# Patient Record
Sex: MALE | Race: WHITE | HISPANIC OR LATINO | Employment: OTHER | URBAN - METROPOLITAN AREA
[De-identification: names, ages, dates, MRNs, and addresses within clinical notes are randomized per-mention and may not be internally consistent; named-entity substitution may affect disease eponyms.]

---

## 2024-02-05 ENCOUNTER — HOSPITAL ENCOUNTER (EMERGENCY)
Facility: HOSPITAL | Age: 69
Discharge: HOME | End: 2024-02-05
Payer: MEDICAID

## 2024-02-05 ENCOUNTER — APPOINTMENT (OUTPATIENT)
Dept: RADIOLOGY | Facility: HOSPITAL | Age: 69
End: 2024-02-05
Payer: MEDICAID

## 2024-02-05 VITALS
DIASTOLIC BLOOD PRESSURE: 77 MMHG | RESPIRATION RATE: 20 BRPM | WEIGHT: 200 LBS | HEIGHT: 67 IN | TEMPERATURE: 97.3 F | BODY MASS INDEX: 31.39 KG/M2 | HEART RATE: 71 BPM | SYSTOLIC BLOOD PRESSURE: 162 MMHG | OXYGEN SATURATION: 98 %

## 2024-02-05 DIAGNOSIS — M25.561 PAIN IN BOTH KNEES, UNSPECIFIED CHRONICITY: Primary | ICD-10-CM

## 2024-02-05 DIAGNOSIS — Z76.0 MEDICATION REFILL: ICD-10-CM

## 2024-02-05 DIAGNOSIS — M17.10 ARTHRITIS OF KNEE: ICD-10-CM

## 2024-02-05 DIAGNOSIS — M25.562 PAIN IN BOTH KNEES, UNSPECIFIED CHRONICITY: Primary | ICD-10-CM

## 2024-02-05 PROCEDURE — 99283 EMERGENCY DEPT VISIT LOW MDM: CPT | Mod: 25

## 2024-02-05 PROCEDURE — 73562 X-RAY EXAM OF KNEE 3: CPT | Mod: BILATERAL PROCEDURE | Performed by: RADIOLOGY

## 2024-02-05 PROCEDURE — 93971 EXTREMITY STUDY: CPT | Performed by: RADIOLOGY

## 2024-02-05 PROCEDURE — 93970 EXTREMITY STUDY: CPT

## 2024-02-05 PROCEDURE — 99284 EMERGENCY DEPT VISIT MOD MDM: CPT | Mod: 25

## 2024-02-05 PROCEDURE — 73562 X-RAY EXAM OF KNEE 3: CPT | Mod: 50

## 2024-02-05 RX ORDER — ATORVASTATIN CALCIUM 10 MG/1
40 TABLET, FILM COATED ORAL DAILY
Qty: 80 TABLET | Refills: 0 | Status: SHIPPED | OUTPATIENT
Start: 2024-02-05 | End: 2024-02-25

## 2024-02-05 RX ORDER — NAPROXEN SODIUM 220 MG/1
81 TABLET, FILM COATED ORAL DAILY
Qty: 360 TABLET | Refills: 0 | Status: SHIPPED | OUTPATIENT
Start: 2024-02-05 | End: 2025-02-04

## 2024-02-05 RX ORDER — NAPROXEN 375 MG/1
375 TABLET ORAL 2 TIMES DAILY PRN
Qty: 10 TABLET | Refills: 0 | Status: SHIPPED | OUTPATIENT
Start: 2024-02-05 | End: 2024-02-15

## 2024-02-05 RX ORDER — METOPROLOL TARTRATE 50 MG/1
25 TABLET ORAL 2 TIMES DAILY
Qty: 20 TABLET | Refills: 0 | Status: SHIPPED | OUTPATIENT
Start: 2024-02-05 | End: 2024-02-25

## 2024-02-05 RX ORDER — FUROSEMIDE 40 MG/1
40 TABLET ORAL 2 TIMES DAILY
Qty: 40 TABLET | Refills: 0 | Status: SHIPPED | OUTPATIENT
Start: 2024-02-05 | End: 2024-02-25

## 2024-02-05 RX ORDER — DICLOFENAC SODIUM 10 MG/G
4 GEL TOPICAL 4 TIMES DAILY PRN
Qty: 100 G | Refills: 0 | Status: SHIPPED | OUTPATIENT
Start: 2024-02-05

## 2024-02-05 ASSESSMENT — PAIN DESCRIPTION - LOCATION: LOCATION: KNEE

## 2024-02-05 ASSESSMENT — PAIN SCALES - GENERAL: PAINLEVEL_OUTOF10: 8

## 2024-02-05 ASSESSMENT — COLUMBIA-SUICIDE SEVERITY RATING SCALE - C-SSRS
1. IN THE PAST MONTH, HAVE YOU WISHED YOU WERE DEAD OR WISHED YOU COULD GO TO SLEEP AND NOT WAKE UP?: NO
2. HAVE YOU ACTUALLY HAD ANY THOUGHTS OF KILLING YOURSELF?: NO
6. HAVE YOU EVER DONE ANYTHING, STARTED TO DO ANYTHING, OR PREPARED TO DO ANYTHING TO END YOUR LIFE?: NO

## 2024-02-05 ASSESSMENT — PAIN DESCRIPTION - DESCRIPTORS: DESCRIPTORS: ACHING

## 2024-02-05 ASSESSMENT — PAIN DESCRIPTION - PROGRESSION: CLINICAL_PROGRESSION: NOT CHANGED

## 2024-02-05 ASSESSMENT — PAIN DESCRIPTION - ORIENTATION: ORIENTATION: RIGHT;LEFT

## 2024-02-05 ASSESSMENT — PAIN DESCRIPTION - ONSET: ONSET: AWAKENED FROM SLEEP

## 2024-02-05 ASSESSMENT — PAIN DESCRIPTION - FREQUENCY: FREQUENCY: CONSTANT/CONTINUOUS

## 2024-02-05 ASSESSMENT — PAIN DESCRIPTION - PAIN TYPE: TYPE: ACUTE PAIN;CHRONIC PAIN

## 2024-02-05 ASSESSMENT — PAIN - FUNCTIONAL ASSESSMENT: PAIN_FUNCTIONAL_ASSESSMENT: 0-10

## 2024-02-06 NOTE — ED PROVIDER NOTES
HPI   Chief Complaint   Patient presents with    Knee Pain     Bilateral knee pain x couple months.       Patient is 68-year-old male presenting to the ED with bilateral knee pain.  Past medical history significant for hypertension, hyperlipidemia, peripheral edema, BPH and arthritis.  Patient states that he is new to this area is coming emerged from because he is having knee pain as well as for medication refill.  Knee pain has been off-and-on for the last couple minutes.  Knee pain exacerbated with ambulation relieved with rest.  Denies popping locking sensation.  States that a couple days ago he did fall landing directly on top of his right knee.  Denies hitting his head, LOC.  No anticoagulation or antiplatelet use.  Pain is worsened since then.  No history of DVTs or PEs.  Denies numbness, tingling or paresthesias distally.  Also requesting medication refill given that he is new to the area and does not have a PCP to follow-up with. states that he ran out of his medications last week.  No other complaints at this time such as chest pain or shortness of breath.  No abdominal pain, nausea, vomiting or diarrhea.                          No data recorded                Patient History   Past Medical History:   Diagnosis Date    Enlarged prostate     Hypertension      Past Surgical History:   Procedure Laterality Date    CARDIAC VALVE REPLACEMENT       No family history on file.  Social History     Tobacco Use    Smoking status: Never     Passive exposure: Never    Smokeless tobacco: Never   Vaping Use    Vaping Use: Never used   Substance Use Topics    Alcohol use: Not on file    Drug use: Never       Physical Exam   ED Triage Vitals [02/05/24 1646]   Temperature Heart Rate Respirations BP   36.3 °C (97.3 °F) 73 20 151/81      Pulse Ox Temp Source Heart Rate Source Patient Position   99 % Temporal Monitor --      BP Location FiO2 (%)     -- --       Physical Exam  Vitals reviewed.   Constitutional:        Appearance: Normal appearance.   HENT:      Head: Normocephalic.      Nose: Nose normal.      Mouth/Throat:      Mouth: Mucous membranes are moist.      Pharynx: Oropharynx is clear.   Eyes:      Extraocular Movements: Extraocular movements intact.      Pupils: Pupils are equal, round, and reactive to light.   Cardiovascular:      Rate and Rhythm: Normal rate and regular rhythm.      Pulses: Normal pulses.      Heart sounds: Normal heart sounds.   Pulmonary:      Effort: Pulmonary effort is normal.      Breath sounds: Normal breath sounds. No wheezing, rhonchi or rales.   Musculoskeletal:         General: Normal range of motion.      Cervical back: Normal range of motion.      Comments: Bilateral joint effusions.  No surrounding erythema, edema or ecchymosis.  Full active range of motion bilateral knees with flexion and extension.  Ambulating without complication.  Tibialis posterior pulse palpated.  Tenderness palpating along the right anterior patella and left knee joint  Space.   Skin:     General: Skin is warm.   Neurological:      General: No focal deficit present.      Mental Status: He is alert. Mental status is at baseline.   Psychiatric:         Mood and Affect: Mood normal.         Behavior: Behavior normal.       XR knee 3 views bilateral   Final Result   No acute fracture or dislocation in the bilateral knees.        Minimal degenerative changes of the patellofemoral compartments   bilaterally.        Small right and trace left knee joint effusions.        Bilateral prepatellar soft tissue swelling.             MACRO:   None        Signed by: Apolinar Cleary 2/5/2024 6:40 PM   Dictation workstation:   [id number]      Vascular US lower extremity venous duplex bilateral   Final Result   No sonographic evidence of DVT in the visualized vessels of the   bilateral lower extremities, allowing for limitations above.             MACRO:   None.        Signed by: Evan Finkelstein 2/5/2024 6:27 PM   Dictation  workstation:   [id number]          ED Course & MDM   Diagnoses as of 02/05/24 1920   Pain in both knees, unspecified chronicity   Arthritis of knee   Medication refill       Medical Decision Making  Independent Historians: Patient    MDM:    68-year-old male presenting to the ED with bilateral knee pain and for medication refill.  The pain has been present off and on for the last couple months.   pain exacerbated with ambulation. Does have a history of arthritis in his ankles in which she has received cortisone injections before in the past.  Patient has not been taking any over-the-counter for his pain.  States a couple days ago he did fall  Laying directly on his right knee.  Denies hitting his head, LOC, anticoagulation or antiplatelet use.  No numbness, tingling or paresthesias distally.  Also states that he needs his meds refilled.  Last took his medications a week ago.  Due to this area does not have a PCP.  Has a history of high blood pressure, peripheral edema, hyperlipidemia.  Requesting that his atorvastatin, metoprolol, aspirin and Lasix be refilled.  I will refill the patient's medications  for 1 month given the potential risk of not refilling them.  On exam patient is nontoxic-appearing no signs of tachycardia, afebrile no signs of respite distress.  Patient has bilateral  swelling in both of his knees.  No surrounding erythema, edema or ecchymosis.  Full active range of motion in both of his knees.  Tenderness to palpate along the anterior aspect of the right knee along the patella.  Tenderness palpating along the joint space of the left knee.  Tibialis posterior pulse palpated bilateral.  Neurovascular intact.  clinically low suspicion of septic joint given patient is afebrile and has no pain with active range of motion. X-ray and ultrasound ordered to further evaluate patient's symptoms.    Imaging interpreted by radiologist suggest no acute fracture or dislocation bilateral knees.  Minimal  degenerative changes of patellofemoral compartment bilaterally.  Small right and trace left knee joint effusions.  Bilateral prepatellar soft tissue swelling.  Vascular ultrasound suggest no DVT visualized in both lower extremities given limitations.  Discussed these fines with the patient which he understands.    Reassessed patient, patient resting comfortably and able to ambulate stable without complication.  Discussed with the patient that symptoms consistent with degenerative changes in his knee and potential contusion given his recent fall.  Will send the patient home with a short course of naproxen as well as Voltaren gel.  Ad[facility]  Also advised patient to follow-up with a PCP for definitive management of his chronic comorbidities.  Give the patient return precautions.  Patient agrees this plan is no further questions at this time.  Patient stable for discharge is neurovascular tact distally.  Diagnosed the patient with pain in both knees, arthritis of knee medication refill.    DDx/Differential:  Arthritis, degenerative changes, sprain, DVT    Diagnosis: pain in both knees, arthritis of knee, medication refill        Dictation Disclaimer: Due to voice recognition software, sound alike and misspelled words may be contained in documentation. If you have any questions please contact me.            Procedure  Procedures     Ben Mcintosh PA-C  02/05/24 1927